# Patient Record
Sex: MALE | Race: WHITE | NOT HISPANIC OR LATINO | Employment: UNEMPLOYED | URBAN - NONMETROPOLITAN AREA
[De-identification: names, ages, dates, MRNs, and addresses within clinical notes are randomized per-mention and may not be internally consistent; named-entity substitution may affect disease eponyms.]

---

## 2017-01-20 DIAGNOSIS — M25.562 LEFT KNEE PAIN, UNSPECIFIED CHRONICITY: Primary | ICD-10-CM

## 2017-01-25 ENCOUNTER — APPOINTMENT (OUTPATIENT)
Dept: GENERAL RADIOLOGY | Facility: HOSPITAL | Age: 22
End: 2017-01-25
Attending: ORTHOPAEDIC SURGERY

## 2023-03-22 ENCOUNTER — HOSPITAL ENCOUNTER (OUTPATIENT)
Dept: GENERAL RADIOLOGY | Facility: HOSPITAL | Age: 28
Discharge: HOME | End: 2023-03-22
Admitting: FAMILY MEDICINE
Payer: COMMERCIAL

## 2023-03-22 ENCOUNTER — OFFICE VISIT (OUTPATIENT)
Dept: ORTHOPEDIC SURGERY | Facility: CLINIC | Age: 28
End: 2023-03-22
Payer: COMMERCIAL

## 2023-03-22 VITALS
SYSTOLIC BLOOD PRESSURE: 136 MMHG | HEIGHT: 76 IN | DIASTOLIC BLOOD PRESSURE: 88 MMHG | BODY MASS INDEX: 26.67 KG/M2 | HEART RATE: 83 BPM | WEIGHT: 219 LBS

## 2023-03-22 DIAGNOSIS — M76.51 PATELLAR TENDONITIS OF RIGHT KNEE: ICD-10-CM

## 2023-03-22 DIAGNOSIS — G89.29 CHRONIC PAIN OF RIGHT KNEE: ICD-10-CM

## 2023-03-22 DIAGNOSIS — M25.561 CHRONIC PAIN OF RIGHT KNEE: ICD-10-CM

## 2023-03-22 DIAGNOSIS — S86.012A STRAIN OF ACHILLES TENDON, LEFT, INITIAL ENCOUNTER: ICD-10-CM

## 2023-03-22 DIAGNOSIS — S86.812A STRAIN OF LEFT CALF MUSCLE: Primary | ICD-10-CM

## 2023-03-22 PROCEDURE — 73562 X-RAY EXAM OF KNEE 3: CPT

## 2023-03-22 PROCEDURE — 76882 US LMTD JT/FCL EVL NVASC XTR: CPT | Performed by: FAMILY MEDICINE

## 2023-03-22 PROCEDURE — 73562 X-RAY EXAM OF KNEE 3: CPT | Performed by: RADIOLOGY

## 2023-03-22 PROCEDURE — 99204 OFFICE O/P NEW MOD 45 MIN: CPT | Performed by: FAMILY MEDICINE

## 2023-03-22 NOTE — PROGRESS NOTES
New Patient Visit      Patient: Torsten Barrios  YOB: 1995  Date of Encounter: 03/22/2023  PCP: Bee Flor APRN  Referring Provider: No ref. provider found     Subjective   Torsten Barrios is a 27 y.o. male who presents to the office today for evaluation of Initial Evaluation of the Right Leg (L achilles )      Chief Complaint   Patient presents with   • Right Leg - Initial Evaluation     L achilles        HPI   The patient is accompanied by his mother and girlfriend.     1. The patient complains of two issues related to the patient being a weightlifter and , and he works out aggressively 6 days a week. The patient believes that he injured his left Achilles and calf area about a week ago. He is unaware of a specific injury that occurred, but he noticed a day after aggressively working on his lower extremities that he had contusions and soreness in the left mid-calf. The patient is not necessarily weak on the left side, but he has significant contusions which are resolving. The patient works at [employer], and they evaluated him and were suspicious of an Achilles or calf tear. The patient is not experiencing pain today at all. He only notices mild discomfort on active plantar flexion of the left ankle, but otherwise it feels fairly normal. He has been icing and elevating and has stopped weightlifting since he noticed the contusions and pain. Sometimes he feels mildly weak on the left side. He has a distant history of a left ankle fracture.    2. The patient complains of chronic pain in the right anterior knee for 6 to 12 months underneath the patellar area. Again, he denies any injury, but states that this area becomes extremely sore as he is reaching the end of his lower extremity workouts and specifically performing heavy squats. He notes that he will be sore for several days after doing this, and he feels weakness and a difference in the medial quadriceps muscle size  "from the right side compared to the uninjured left lower extremity. He has had no treatment for this and has had no work-up.      There is no problem list on file for this patient.      History reviewed. No pertinent past medical history.    History reviewed. No pertinent surgical history.    Family History   Problem Relation Age of Onset   • Kidney disease Father    • Diabetes Father        Social History     Socioeconomic History   • Marital status: Single   Tobacco Use   • Smoking status: Never   • Smokeless tobacco: Never   Vaping Use   • Vaping Use: Never used   Substance and Sexual Activity   • Alcohol use: Yes     Alcohol/week: 6.0 standard drinks     Types: 6 Cans of beer per week   • Drug use: Never   • Sexual activity: Defer       Current Outpatient Medications   Medication Sig Dispense Refill   • Diclofenac Sodium (VOLTAREN) 1 % gel gel Apply 4 g topically to the appropriate area as directed 4 (Four) Times a Day As Needed (right knee and left calf pain). 150 g 1     No current facility-administered medications for this visit.       No Known Allergies    Review of Systems   Constitutional: Positive for activity change. Negative for fever.   Respiratory: Negative for shortness of breath and wheezing.    Cardiovascular: Negative for chest pain.   Musculoskeletal: Positive for arthralgias and myalgias.   Skin: Negative for color change and wound.   Neurological: Negative for weakness and numbness.       Visit Vitals  /88 (BP Location: Right arm, Patient Position: Sitting, Cuff Size: Adult)   Pulse 83   Ht 193 cm (76\")   Wt 99.3 kg (219 lb)   BMI 26.66 kg/m²     27 y.o.male  Physical Exam  Vitals and nursing note reviewed.   Constitutional:       General: He is not in acute distress.     Appearance: Normal appearance.   Pulmonary:      Effort: Pulmonary effort is normal. No respiratory distress.   Musculoskeletal:      Right lower leg: No swelling. No edema.      Left lower leg: Tenderness present. No " swelling. No edema.      Right ankle:      Right Achilles Tendon: No tenderness or defects. Cuevas's test negative.      Left ankle:      Left Achilles Tendon: No tenderness or defects. Cuevas's test negative.        Legs:       Comments: Bilateral lower extremities:  2+ pedal pulses.  2+ deep tendon reflexes.  Intact strength and sensation.    Left lower extremity:  Mildly restricted ankle dorsiflexion.  Negative Cuevas test.  Intact strength of calf.  Contusions noted in mid-calf around myotendinous junctions with possible questionable mild muscle bunching at that area on resisted plantar flexion.  Plantar flexion strength is intact.  There is no significant swelling.  Palpation of the ankle and Achilles tendon is nontender. The tendon feels to be intact.    Right knee:  No effusion.  Full range of motion.  Tender to palpation exquisitely over patellar tendon, but the tendon is intact.  Negative varus, valgus stress, AP drawer, Lachman, and Tarsha.  Cannot recreate pain in this area on resisted knee extension or hip flexion with the knee fully extended, but it continues to be very tender.  Knee exam is otherwise within normal limits.   Skin:     General: Skin is warm and dry.      Findings: No erythema.   Neurological:      General: No focal deficit present.      Mental Status: He is alert.      Sensory: No sensory deficit.      Motor: No weakness.         Radiology Results:    XR Knee 3+ View With [place] Right    Result Date: 3/22/2023    No acute findings in the right knee.  This report was finalized on 3/22/2023 10:04 AM by Dr. Alejo Brock MD.      In office procedures/imaging  Diagnostic musculoskeletal ultrasound- left calf  Indications calf pain and injury.  Rule out Achilles tendon rupture    Findings: Left Achilles tendon appears intact without any overt disruptions from calcaneal insertion through myotendinous junction with no visible fluid collections or hematoma.  Was compared to uninjured  side.  Images saved on ultrasound machine.  Consider MRI if further imaging is needed.      Assessment & Plan   Diagnoses and all orders for this visit:    1. Strain of left calf muscle (Primary)  -     Diclofenac Sodium (VOLTAREN) 1 % gel gel; Apply 4 g topically to the appropriate area as directed 4 (Four) Times a Day As Needed (right knee and left calf pain).  Dispense: 150 g; Refill: 1  -     Ambulatory Referral to Physical Therapy Evaluate and treat    2. Strain of Achilles tendon, left, initial encounter  -     Diclofenac Sodium (VOLTAREN) 1 % gel gel; Apply 4 g topically to the appropriate area as directed 4 (Four) Times a Day As Needed (right knee and left calf pain).  Dispense: 150 g; Refill: 1  -     Ambulatory Referral to Physical Therapy Evaluate and treat    3. Chronic pain of right knee  -     XR Knee 3+ View With [place] Right; Future  -     Diclofenac Sodium (VOLTAREN) 1 % gel gel; Apply 4 g topically to the appropriate area as directed 4 (Four) Times a Day As Needed (right knee and left calf pain).  Dispense: 150 g; Refill: 1  -     Ambulatory Referral to Physical Therapy Evaluate and treat    4. Patellar tendonitis of right knee  -     XR Knee 3+ View With [place] Right; Future  -     Diclofenac Sodium (VOLTAREN) 1 % gel gel; Apply 4 g topically to the appropriate area as directed 4 (Four) Times a Day As Needed (right knee and left calf pain).  Dispense: 150 g; Refill: 1  -     Ambulatory Referral to Physical Therapy Evaluate and treat         MEDS ORDERED DURING VISIT:  New Medications Ordered This Visit   Medications   • Diclofenac Sodium (VOLTAREN) 1 % gel gel     Sig: Apply 4 g topically to the appropriate area as directed 4 (Four) Times a Day As Needed (right knee and left calf pain).     Dispense:  150 g     Refill:  1     MEDICATION ISSUES:  Discussed medication options and treatment plan of prescribed medication as well as the risks, benefits, and side effects including potential falls,  possible impaired driving and metabolic adversities among others. Patient is agreeable to call the office with any worsening of symptoms or onset of side effects. Patient is agreeable to call 911 or go to the nearest ER should he/she begin having SI/HI.     Discussion:    1.  X-ray right knee negative today  2.  Ultrasound exam of the injured left calf does not show any tendon disruption or apparent tears.  Patient is likely suffered a grade 1-2 injury of the gastrocnemius musculature at or above the myotendinous junction.  Would consider MRI if not improving with conservative treatment.  3. Recommend patient continue to restrict his lower extremity workouts, as the right knee is likely an overuse injury, and the left calf needs some time to heal and likely represents a grade 1-2 strain at the myotendinous junction. The patient is referred to physical therapy, which will be done at [facility], where he works to try to rehabilitate both of these areas. We will slowly work him back up to full activity. I explained to the patient that he needs to listen to his body when he is experiencing a significant amount of pain with specific actions, as it means he is likely overdoing it. We will likely reevaluate after a few weeks of physical therapy. The patient was also given topical Voltaren for pain relief and may use Tylenol, ice, and heat.               This document has been electronically signed by Carlos Jones DO   March 22, 2023 23:25 EDT    Part of this note may be an electronic transcription/translation of spoken language to printed text using the Dragon Dictation System.    Transcribed from ambient dictation for Carlos Jones DO by Nadiya Oseguera.  03/22/23   12:28 EDT    I have personally performed the services described in this document as transcribed by the above individual, and it is both accurate and complete.

## 2023-07-25 ENCOUNTER — OFFICE VISIT (OUTPATIENT)
Dept: SURGERY | Facility: CLINIC | Age: 28
End: 2023-07-25
Payer: COMMERCIAL

## 2023-07-25 VITALS — BODY MASS INDEX: 25.57 KG/M2 | HEIGHT: 76 IN | WEIGHT: 210 LBS

## 2023-07-25 DIAGNOSIS — R59.9 ADENOPATHY: Primary | ICD-10-CM

## 2023-07-25 DIAGNOSIS — R59.9 ADENOPATHY: ICD-10-CM

## 2023-07-25 PROCEDURE — 10021 FNA BX W/O IMG GDN 1ST LES: CPT | Performed by: SURGERY

## 2023-07-25 PROCEDURE — 1159F MED LIST DOCD IN RCRD: CPT | Performed by: SURGERY

## 2023-07-25 PROCEDURE — 1160F RVW MEDS BY RX/DR IN RCRD: CPT | Performed by: SURGERY

## 2023-07-25 PROCEDURE — 99203 OFFICE O/P NEW LOW 30 MIN: CPT | Performed by: SURGERY

## 2023-07-25 NOTE — PROGRESS NOTES
Subjective   Torsten Barrios is a 28 y.o. male.     Chief Complaint: adenopathy    History of Present Illness He is a 27 yo who has had enlarged nodes in the right groin for 2 years. No weight changes, fever, chills, cough, or other symptoms. He had used testosterone shots in the buttocks over the last 4 years until a couple of weeks ago. He has been a  and lifts weights.    The following portions of the patient's history were reviewed and updated as appropriate: current medications, past family history, past medical history, past social history, past surgical history and problem list.    Review of Systems   Constitutional:  Negative for activity change, appetite change, chills, fever and unexpected weight change.   HENT:  Negative for congestion, facial swelling and sore throat.    Eyes:  Negative for photophobia and visual disturbance.   Respiratory:  Negative for chest tightness, shortness of breath and wheezing.    Cardiovascular:  Negative for chest pain, palpitations and leg swelling.   Gastrointestinal:  Negative for abdominal distention, abdominal pain, anal bleeding, blood in stool, constipation, diarrhea, nausea, rectal pain and vomiting.   Endocrine: Negative for cold intolerance, heat intolerance, polydipsia and polyuria.   Genitourinary:  Negative for difficulty urinating, dysuria, flank pain and urgency.   Musculoskeletal:  Negative for back pain and myalgias.   Skin:  Negative for rash and wound.   Allergic/Immunologic: Negative for immunocompromised state.   Neurological:  Negative for dizziness, seizures, syncope, light-headedness, numbness and headaches.   Hematological:  Positive for adenopathy. Does not bruise/bleed easily.   Psychiatric/Behavioral:  Negative for behavioral problems and confusion. The patient is not nervous/anxious.      Objective   Physical Exam  Vitals reviewed.   Constitutional:       General: He is not in acute distress.     Appearance: He is well-developed. He is  not ill-appearing.   HENT:      Head: Normocephalic. No laceration. Hair is normal.      Right Ear: Hearing and ear canal normal.      Left Ear: Hearing and ear canal normal.      Nose: Nose normal.      Right Sinus: No maxillary sinus tenderness or frontal sinus tenderness.      Left Sinus: No maxillary sinus tenderness or frontal sinus tenderness.   Eyes:      General: Lids are normal.      Conjunctiva/sclera: Conjunctivae normal.      Pupils: Pupils are equal, round, and reactive to light.   Neck:      Thyroid: No thyroid mass or thyromegaly.      Vascular: No JVD.      Trachea: No tracheal tenderness or tracheal deviation.   Cardiovascular:      Rate and Rhythm: Normal rate and regular rhythm.      Heart sounds: No murmur heard.    No gallop.   Pulmonary:      Effort: Pulmonary effort is normal.      Breath sounds: Normal breath sounds. No stridor. No wheezing.   Chest:      Chest wall: No tenderness.   Abdominal:      General: Bowel sounds are normal. There is no distension.      Palpations: Abdomen is soft. There is no mass.      Tenderness: There is no abdominal tenderness. There is no guarding or rebound.      Hernia: No hernia is present.   Musculoskeletal:         General: No deformity.      Cervical back: Normal range of motion.   Lymphadenopathy:      Cervical: No cervical adenopathy.      Upper Body:      Right upper body: No supraclavicular adenopathy.      Left upper body: No supraclavicular adenopathy.   Skin:     General: Skin is warm and dry.      Coloration: Skin is not pale.      Findings: No erythema or rash.   Neurological:      Mental Status: He is alert and oriented to person, place, and time.      Motor: No abnormal muscle tone.   Psychiatric:         Behavior: Behavior normal.         Thought Content: Thought content normal.   FNA done on the right groin node., the nodes are not terribly large but he is thin and they are easy to feel.    No past medical history on file.    Family History    Problem Relation Age of Onset    Kidney disease Father     Diabetes Father        Social History     Tobacco Use    Smoking status: Never    Smokeless tobacco: Never   Vaping Use    Vaping Use: Never used   Substance Use Topics    Alcohol use: Yes     Alcohol/week: 6.0 standard drinks     Types: 6 Cans of beer per week    Drug use: Never       No past surgical history on file.    Current Outpatient Medications   Medication Instructions    Diclofenac Sodium (VOLTAREN) 4 g, Topical, 4 Times Daily PRN         Assessment & Plan   Diagnoses and all orders for this visit:    1. Adenopathy (Primary)    FU one week

## 2023-07-26 LAB — REF LAB TEST METHOD: NORMAL

## 2023-07-31 ENCOUNTER — OFFICE VISIT (OUTPATIENT)
Dept: SURGERY | Facility: CLINIC | Age: 28
End: 2023-07-31
Payer: COMMERCIAL

## 2023-07-31 VITALS — WEIGHT: 210 LBS | BODY MASS INDEX: 25.57 KG/M2 | HEIGHT: 76 IN

## 2023-07-31 DIAGNOSIS — R59.9 ADENOPATHY: Primary | ICD-10-CM

## 2023-07-31 PROCEDURE — 1159F MED LIST DOCD IN RCRD: CPT | Performed by: SURGERY

## 2023-07-31 PROCEDURE — 99212 OFFICE O/P EST SF 10 MIN: CPT | Performed by: SURGERY

## 2023-07-31 PROCEDURE — 1160F RVW MEDS BY RX/DR IN RCRD: CPT | Performed by: SURGERY
